# Patient Record
Sex: FEMALE | Race: OTHER | HISPANIC OR LATINO | URBAN - METROPOLITAN AREA
[De-identification: names, ages, dates, MRNs, and addresses within clinical notes are randomized per-mention and may not be internally consistent; named-entity substitution may affect disease eponyms.]

---

## 2023-03-03 ENCOUNTER — OUTPATIENT (OUTPATIENT)
Dept: OUTPATIENT SERVICES | Facility: HOSPITAL | Age: 68
LOS: 1 days | End: 2023-03-03
Payer: MEDICARE

## 2023-03-03 ENCOUNTER — RESULT REVIEW (OUTPATIENT)
Age: 68
End: 2023-03-03

## 2023-03-03 DIAGNOSIS — R55 SYNCOPE AND COLLAPSE: ICD-10-CM

## 2023-03-03 DIAGNOSIS — Z00.8 ENCOUNTER FOR OTHER GENERAL EXAMINATION: ICD-10-CM

## 2023-03-03 DIAGNOSIS — R06.02 SHORTNESS OF BREATH: ICD-10-CM

## 2023-03-03 PROCEDURE — 78452 HT MUSCLE IMAGE SPECT MULT: CPT

## 2023-03-03 PROCEDURE — A9500: CPT

## 2023-03-03 PROCEDURE — 78452 HT MUSCLE IMAGE SPECT MULT: CPT | Mod: 26

## 2023-03-03 PROCEDURE — 93018 CV STRESS TEST I&R ONLY: CPT

## 2023-03-04 DIAGNOSIS — R55 SYNCOPE AND COLLAPSE: ICD-10-CM

## 2023-03-09 DIAGNOSIS — R06.02 SHORTNESS OF BREATH: ICD-10-CM

## 2023-03-09 DIAGNOSIS — R55 SYNCOPE AND COLLAPSE: ICD-10-CM

## 2023-03-10 DIAGNOSIS — R55 SYNCOPE AND COLLAPSE: ICD-10-CM

## 2023-03-10 DIAGNOSIS — R06.02 SHORTNESS OF BREATH: ICD-10-CM

## 2023-07-18 PROBLEM — R06.02 SHORTNESS OF BREATH: Status: ACTIVE | Noted: 2023-07-18

## 2023-07-19 ENCOUNTER — APPOINTMENT (OUTPATIENT)
Dept: CARDIOLOGY | Facility: CLINIC | Age: 68
End: 2023-07-19
Payer: MEDICARE

## 2023-07-19 VITALS — HEIGHT: 61 IN | BODY MASS INDEX: 21.9 KG/M2 | WEIGHT: 116 LBS

## 2023-07-19 VITALS — HEART RATE: 88 BPM | DIASTOLIC BLOOD PRESSURE: 70 MMHG | SYSTOLIC BLOOD PRESSURE: 110 MMHG

## 2023-07-19 DIAGNOSIS — R06.02 SHORTNESS OF BREATH: ICD-10-CM

## 2023-07-19 PROCEDURE — 99204 OFFICE O/P NEW MOD 45 MIN: CPT | Mod: 25

## 2023-07-19 PROCEDURE — 93000 ELECTROCARDIOGRAM COMPLETE: CPT

## 2023-07-19 NOTE — PHYSICAL EXAM
[Normal Venous Pressure] : normal venous pressure [Normal S1, S2] : normal S1, S2 [Clear Lung Fields] : clear lung fields [Soft] : abdomen soft [No Edema] : no edema [de-identified] : rrr

## 2023-07-19 NOTE — DISCUSSION/SUMMARY
[FreeTextEntry1] : pt ppm stable \par abn ekg with IVANIA will check on echo as last did not see \par will get bloodwork \par f/u in 6 months \par no sob on ranexa  [EKG obtained to assist in diagnosis and management of assessed problem(s)] : EKG obtained to assist in diagnosis and management of assessed problem(s)

## 2023-07-19 NOTE — HISTORY OF PRESENT ILLNESS
[FreeTextEntry1] : Patient with anxiety, CERVICAL SPINE DZ, HLD, PPM with sss after syncope 11/22. \par ROSANNE damon: \par \par 3/2023: Nuclear:  No evidence of ischemia or infarction. LVEF is greater than 55%.\par 3/23: lvef 67%, mild to mod MR \par 7/23: LDL 67 HDL 62 GFR: 64  bnp: 17 \par pt LDL elevated in past but now on rsouvastatin and controlled, pt has sob with exertion intermittent and patient had negative stress nuclear on ranexa and says improved entirely now. \par pt walking in neighborhood now 3 times/week with no issues. \par pt saw dr. damon 2 weeks ago and PPM is norml with no issues. \par \par

## 2023-07-19 NOTE — CARDIOLOGY SUMMARY
[de-identified] : 7/19/23: NSR IVANIA with irbbb  [de-identified] : 3/2023: Nuclear: Normal myocardial perfusion. No evidence of ischemia or infarction. LVEF is greater than 55%.

## 2023-08-11 RX ORDER — ROSUVASTATIN CALCIUM 10 MG/1
10 TABLET, FILM COATED ORAL
Qty: 90 | Refills: 3 | Status: ACTIVE | COMMUNITY
Start: 2023-08-11 | End: 1900-01-01

## 2023-08-11 RX ORDER — RANOLAZINE 500 MG/1
500 TABLET, EXTENDED RELEASE ORAL
Qty: 180 | Refills: 3 | Status: ACTIVE | COMMUNITY
Start: 2023-08-11 | End: 1900-01-01

## 2023-12-01 ENCOUNTER — APPOINTMENT (OUTPATIENT)
Dept: CARDIOLOGY | Facility: CLINIC | Age: 68
End: 2023-12-01
Payer: MEDICARE

## 2023-12-01 PROCEDURE — 93306 TTE W/DOPPLER COMPLETE: CPT

## 2024-01-17 ENCOUNTER — APPOINTMENT (OUTPATIENT)
Dept: CARDIOLOGY | Facility: CLINIC | Age: 69
End: 2024-01-17

## 2024-01-18 ENCOUNTER — APPOINTMENT (OUTPATIENT)
Dept: NEUROLOGY | Facility: CLINIC | Age: 69
End: 2024-01-18
Payer: MEDICARE

## 2024-01-18 VITALS
SYSTOLIC BLOOD PRESSURE: 120 MMHG | HEART RATE: 95 BPM | HEIGHT: 60 IN | BODY MASS INDEX: 23.56 KG/M2 | DIASTOLIC BLOOD PRESSURE: 71 MMHG | WEIGHT: 120 LBS

## 2024-01-18 DIAGNOSIS — Z86.39 PERSONAL HISTORY OF OTHER ENDOCRINE, NUTRITIONAL AND METABOLIC DISEASE: ICD-10-CM

## 2024-01-18 DIAGNOSIS — Z86.79 PERSONAL HISTORY OF OTHER DISEASES OF THE CIRCULATORY SYSTEM: ICD-10-CM

## 2024-01-18 DIAGNOSIS — Z78.9 OTHER SPECIFIED HEALTH STATUS: ICD-10-CM

## 2024-01-18 PROCEDURE — 99204 OFFICE O/P NEW MOD 45 MIN: CPT

## 2024-01-18 NOTE — HISTORY OF PRESENT ILLNESS
[FreeTextEntry1] : Ms. Rueda is a 68-year-old woman who presents today in neurologic consultation accompanied by her daughter for symptoms of short-term memory loss and forgetfulness over the past year. Patient will forget errands. She repeats herself and has to be told the same things over again. She has left the stove on occasionally. Daughter has found patient's cellphone in the refrigerator. Patient is retired and does not drive. Patient's mother and sister both had Alzheimer's disease.   Patient also complains of frequent headaches and dizziness. She has a permanent pacemaker placed as well.

## 2024-01-18 NOTE — PHYSICAL EXAM
[FreeTextEntry1] :  NEUROLOGICAL EXAMINATION:  Mental Status: Patient scored either 19/30 or 24/30 on the MMSE depending upon whether we included subtraction which she could do none of.   Cranial Nerves Cranial Nerves:  II, III, IV, VI: Pupils are equal, round, and reactive to light and accommodation. No evidence of afferent pupillary defect. Visual fields are full to confrontation. Eye movements are full without evidence of nystagmus or internuclear ophthalmoplegia. Funduscopic examination reveals sharp disc margins.  V: Normal jaw movements. Normal facial sensation.  VII: Normal facial motor testing.  VIII: Grossly normal hearing bilaterally.  IX, X: Palate moves symmetrically. No dysarthria.  XI: Normal shoulder shrug and sternocleidomastoid power.  XII: Tongue appears normal and protrudes in the midline.  Motor: Normal bulk, tone, and power throughout.  Muscle Stretch Reflexes (right/left): 2+ symmetrical.  Plantar Responses: Flexor bilaterally.  Coordination: Normal finger to nose and heel to shin testing, no truncal ataxia and no tremor.  Sensation: Normal primary sensation. Normal double simultaneous stimulation.  Gait and Station: Normal base, stride, and turning. Normal toe and heel walking. Normal tandem. Negative Romberg.

## 2024-01-18 NOTE — ASSESSMENT
[FreeTextEntry1] : Impression is that of early dementia, mild cognitive impairment. I recommended an MRI of the brain if her pacemaker is MRI compatible. If the pacemaker is not compatible, we should get an FDG PET scan because of strong family history of Alzheimer's disease. We will also obtain a neuropsychological evaluation.    Total clinician time spent today on the patient is 45 minutes including preparing to see the patient, obtaining and/or reviewing and confirming history, performing medically necessary and appropriate examination, counseling and educating the patient and/or family, documenting clinical information in the EHR and communicating and/or referring to other healthcare professionals.   Entered by Bridgett Al acting as scribe for Dr. Malik.   The documentation recorded by the scribe, in my presence, accurately reflects the service I personally performed, and the decisions made by me with my edits as appropriate. Charli Malik MD, FAAN, FACP Diplomate [employer] Board of Psychiatry & Neurology.

## 2024-01-30 ENCOUNTER — APPOINTMENT (OUTPATIENT)
Dept: CARDIOLOGY | Facility: CLINIC | Age: 69
End: 2024-01-30
Payer: MEDICARE

## 2024-01-30 VITALS — BODY MASS INDEX: 23.26 KG/M2 | OXYGEN SATURATION: 100 % | HEIGHT: 60 IN | WEIGHT: 118.5 LBS | HEART RATE: 108 BPM

## 2024-01-30 VITALS — DIASTOLIC BLOOD PRESSURE: 60 MMHG | HEART RATE: 85 BPM | SYSTOLIC BLOOD PRESSURE: 122 MMHG

## 2024-01-30 DIAGNOSIS — Z00.00 ENCOUNTER FOR GENERAL ADULT MEDICAL EXAMINATION W/OUT ABNORMAL FINDINGS: ICD-10-CM

## 2024-01-30 DIAGNOSIS — R94.31 ABNORMAL ELECTROCARDIOGRAM [ECG] [EKG]: ICD-10-CM

## 2024-01-30 PROCEDURE — 99214 OFFICE O/P EST MOD 30 MIN: CPT

## 2024-01-30 NOTE — DISCUSSION/SUMMARY
[FreeTextEntry1] : pt ppm stable  abn ekg with IVANIA  cont ranolazine er 500 q12 no sob  cont rosuvastatin 10 mg po qd  will get bloodwork  get carotid  increase hydration  get PPM checked  f/u in 2 months

## 2024-01-30 NOTE — PHYSICAL EXAM
[Normal Venous Pressure] : normal venous pressure [Normal S1, S2] : normal S1, S2 [Clear Lung Fields] : clear lung fields [Soft] : abdomen soft [No Edema] : no edema [de-identified] : rrr

## 2024-01-30 NOTE — CARDIOLOGY SUMMARY
[de-identified] : 7/19/23: NSR IVANIA with irbbb  [de-identified] : 3/2023: Nuclear: Normal myocardial perfusion. No evidence of ischemia or infarction. LVEF is greater than 55%.

## 2024-01-30 NOTE — HISTORY OF PRESENT ILLNESS
[FreeTextEntry1] : Patient with anxiety, CERVICAL SPINE DZ, HLD, PPM with sss after syncope 11/22., Angina  ROSANNE damon:   3/2023: Nuclear:  No evidence of ischemia or infarction. LVEF is greater than 55%. 3/23: lvef 67%, mild to mod MR  7/23: LDL 67 HDL 62 GFR: 64  bnp: 17  pt LDL elevated in past but now on rsouvastatin and controlled, pt has sob with exertion intermittent and patient had negative stress nuclear on ranexa and says improved entirely now.  pt walking in neighborhood now 3 times/week with no issues.  pt saw dr. damon 2 weeks ago and PPM is normal with no issues.   1/30/24: 12/1/23: EF: 62%, e' sept: 0.06, E/e': 11, CO: 4, LVOT: 16, DD1, mild MR, mr stable  labs:  pt c/o of dizzy spells intermittent, pt was flushed through her whole body the other dya. pt without angina on ranolazine.  pt says worse with getting up to got to bathroom and patient getting slower and removes. pt still getting lightheaded when gets up from bed. pt not drinking enough water per day. pt possibly 32 ounces daily.  pt to get PPM checked (dr. damon)

## 2024-03-18 ENCOUNTER — APPOINTMENT (OUTPATIENT)
Dept: CARDIOLOGY | Facility: CLINIC | Age: 69
End: 2024-03-18
Payer: MEDICARE

## 2024-03-18 PROCEDURE — 93880 EXTRACRANIAL BILAT STUDY: CPT

## 2024-03-25 ENCOUNTER — APPOINTMENT (OUTPATIENT)
Dept: CARDIOLOGY | Facility: CLINIC | Age: 69
End: 2024-03-25
Payer: MEDICARE

## 2024-03-25 VITALS — WEIGHT: 120 LBS | BODY MASS INDEX: 22.66 KG/M2 | HEIGHT: 61 IN

## 2024-03-25 VITALS — DIASTOLIC BLOOD PRESSURE: 70 MMHG | SYSTOLIC BLOOD PRESSURE: 110 MMHG | HEART RATE: 85 BPM

## 2024-03-25 DIAGNOSIS — I20.9 ANGINA PECTORIS, UNSPECIFIED: ICD-10-CM

## 2024-03-25 DIAGNOSIS — R55 SYNCOPE AND COLLAPSE: ICD-10-CM

## 2024-03-25 DIAGNOSIS — Z95.0 PRESENCE OF CARDIAC PACEMAKER: ICD-10-CM

## 2024-03-25 DIAGNOSIS — Z86.79 PERSONAL HISTORY OF OTHER DISEASES OF THE CIRCULATORY SYSTEM: ICD-10-CM

## 2024-03-25 DIAGNOSIS — I34.0 NONRHEUMATIC MITRAL (VALVE) INSUFFICIENCY: ICD-10-CM

## 2024-03-25 DIAGNOSIS — E78.2 MIXED HYPERLIPIDEMIA: ICD-10-CM

## 2024-03-25 PROCEDURE — 99214 OFFICE O/P EST MOD 30 MIN: CPT

## 2024-03-25 NOTE — HISTORY OF PRESENT ILLNESS
[FreeTextEntry1] : Patient with PPM with sss after syncope 11/22., Angina, mitral regurgitation, anxiety, CERVICAL SPINE DZ, HLD,  EP marisol:   3/2023: Nuclear:  No evidence of ischemia or infarction. LVEF is greater than 55%. 3/23: lvef 67%, mild to mod MR  7/23: LDL 67 HDL 62 GFR: 64  bnp: 17  pt LDL elevated in past but now on rsouvastatin and controlled, pt has sob with exertion intermittent and patient had negative stress nuclear on ranexa and says improved entirely now.  pt walking in neighborhood now 3 times/week with no issues.  pt saw dr. damon 2 weeks ago and PPM is normal with no issues.   1/30/24: 12/1/23: EF: 62%, e' sept: 0.06, E/e': 11, CO: 4, LVOT: 16, DD1, mild MR, mr stable  labs:  pt c/o of dizzy spells intermittent, pt was flushed through her whole body the other dya. pt without angina on ranolazine.  pt says worse with getting up to got to bathroom and patient getting slower and removes. pt still getting lightheaded when gets up from bed. pt not drinking enough water per day. pt possibly 32 ounces daily.  pt to get PPM checked (dr. damon)  3/25/24: 3/18/24: Carotid: b/l less than 50% ICA  2/24: gfr: 65, HDL 71 LDL 51 see EP NOTE: PT WITH MOSTLY AS/VS  < 0.1%  standing bp is 108/70, pt says getting dizzy more frequently now. PPM has no events from 2/1/24  pt says when laid down and gets up she fills quickly. pt does not drink water per daughter only 2 bottles. pt says sob at times and tired walking at [employer] it.s pt sleeps poorly at night.

## 2024-03-25 NOTE — CARDIOLOGY SUMMARY
[de-identified] : 7/19/23: NSR IVANIA with irbbb  [de-identified] : 3/2023: Nuclear: Normal myocardial perfusion. No evidence of ischemia or infarction. LVEF is greater than 55%.

## 2024-03-25 NOTE — PHYSICAL EXAM
[Normal Venous Pressure] : normal venous pressure [Clear Lung Fields] : clear lung fields [Normal S1, S2] : normal S1, S2 [No Edema] : no edema [Soft] : abdomen soft [de-identified] : rrr

## 2024-04-25 ENCOUNTER — OUTPATIENT (OUTPATIENT)
Dept: OUTPATIENT SERVICES | Facility: HOSPITAL | Age: 69
LOS: 1 days | End: 2024-04-25
Payer: MEDICARE

## 2024-04-25 ENCOUNTER — RESULT REVIEW (OUTPATIENT)
Age: 69
End: 2024-04-25

## 2024-04-25 DIAGNOSIS — F03.90 UNSPECIFIED DEMENTIA WITHOUT BEHAVIORAL DISTURBANCE: ICD-10-CM

## 2024-04-25 DIAGNOSIS — Z00.8 ENCOUNTER FOR OTHER GENERAL EXAMINATION: ICD-10-CM

## 2024-04-25 PROCEDURE — 70551 MRI BRAIN STEM W/O DYE: CPT | Mod: 26

## 2024-04-25 PROCEDURE — 70551 MRI BRAIN STEM W/O DYE: CPT

## 2024-04-26 DIAGNOSIS — F03.90 UNSPECIFIED DEMENTIA WITHOUT BEHAVIORAL DISTURBANCE: ICD-10-CM

## 2024-05-07 ENCOUNTER — TRANSCRIPTION ENCOUNTER (OUTPATIENT)
Age: 69
End: 2024-05-07

## 2024-05-07 ENCOUNTER — OUTPATIENT (OUTPATIENT)
Dept: OUTPATIENT SERVICES | Facility: HOSPITAL | Age: 69
LOS: 1 days | Discharge: HOME | End: 2024-05-07

## 2024-05-07 VITALS
TEMPERATURE: 97 F | HEART RATE: 101 BPM | DIASTOLIC BLOOD PRESSURE: 89 MMHG | SYSTOLIC BLOOD PRESSURE: 98 MMHG | HEIGHT: 61 IN | RESPIRATION RATE: 18 BRPM | WEIGHT: 119.93 LBS

## 2024-05-07 VITALS
RESPIRATION RATE: 18 BRPM | DIASTOLIC BLOOD PRESSURE: 61 MMHG | HEART RATE: 72 BPM | SYSTOLIC BLOOD PRESSURE: 106 MMHG | OXYGEN SATURATION: 100 %

## 2024-05-07 DIAGNOSIS — Z95.0 PRESENCE OF CARDIAC PACEMAKER: Chronic | ICD-10-CM

## 2024-05-07 DIAGNOSIS — Z12.11 ENCOUNTER FOR SCREENING FOR MALIGNANT NEOPLASM OF COLON: ICD-10-CM

## 2024-05-07 DIAGNOSIS — Z98.891 HISTORY OF UTERINE SCAR FROM PREVIOUS SURGERY: Chronic | ICD-10-CM

## 2024-05-07 RX ORDER — ROSUVASTATIN CALCIUM 5 MG/1
1 TABLET ORAL
Refills: 0 | DISCHARGE

## 2024-05-07 RX ORDER — METOPROLOL TARTRATE 50 MG
1 TABLET ORAL
Refills: 0 | DISCHARGE

## 2024-05-07 RX ORDER — RANOLAZINE 500 MG/1
500 TABLET, FILM COATED, EXTENDED RELEASE ORAL
Refills: 0 | DISCHARGE

## 2024-05-07 NOTE — ASU PATIENT PROFILE, ADULT - NSICDXPASTMEDICALHX_GEN_ALL_CORE_FT
PAST MEDICAL HISTORY:  Anxiety     High cholesterol     History of loss of consciousness     Migraines

## 2024-05-07 NOTE — ASU DISCHARGE PLAN (ADULT/PEDIATRIC) - CARE PROVIDER_API CALL
Herson Delatorre J  Gastroenterology  [address] [place] [address]  Phone: [phone]  Fax: [phone]  Established Patient  Follow Up Time:

## 2024-05-07 NOTE — CHART NOTE - NSCHARTNOTEFT_GEN_A_CORE
PACU ANESTHESIA ADMISSION NOTE      Procedure:   Post op diagnosis:      ____  Intubated  TV:______       Rate: ______      FiO2: ______    _x___  Patent Airway    _x___  Full return of protective reflexes    ____  Full recovery from anesthesia / back to baseline status    Vitals: P 80 R 16 T 97.2 /58 SpO2 98%  T(C): 36.2 (05-07-24 @ 09:30), Max: 36.2 (05-07-24 @ 09:30)  HR: 101 (05-07-24 @ 09:30) (101 - 101)  BP: 98/89 (05-07-24 @ 09:30) (98/89 - 98/89)  RR: 18 (05-07-24 @ 09:30) (18 - 18)  SpO2: --    Mental Status:  ____ Awake   _____ Alert   _____ Drowsy   __x___ Sedated    Nausea/Vomiting:  _x___  NO       ______Yes,   See Post - Op Orders         Pain Scale (0-10):  __0___    Treatment: _x___ None    ____ See Post - Op/PCA Orders    Post - Operative Fluids:   __x__ Oral   ____ See Post - Op Orders  -------------as per surgeon    Plan: Discharge:   _x___Home       _____Floor     _____Critical Care    _____  Other:_________________    Comments:  No anesthesia issues or complications noted.  Discharge when criteria met.

## 2024-05-07 NOTE — ASU PATIENT PROFILE, ADULT - TEACHING/LEARNING EDUCATIONAL LEVEL
Continue reflux precautions and Pepcid twice daily  Refer to GI medicine in [place] for upper and lower endoscopy which secondary to Covid she has delayed.  CT scan of the neck with contrast  Follow-up evaluation here in 4 to 6 weeks  Call or return for problems  
high school

## 2024-05-07 NOTE — PRE-ANESTHESIA EVALUATION ADULT - NSANTHPMHFT_GEN_ALL_CORE
HX of SSS as per cardiology note, recent nuclear stress test normal  S/P PPM  Lungs clear  no murmur

## 2024-05-09 ENCOUNTER — APPOINTMENT (OUTPATIENT)
Dept: NEUROLOGY | Facility: CLINIC | Age: 69
End: 2024-05-09
Payer: MEDICARE

## 2024-05-09 DIAGNOSIS — K64.8 OTHER HEMORRHOIDS: ICD-10-CM

## 2024-05-09 DIAGNOSIS — Z12.11 ENCOUNTER FOR SCREENING FOR MALIGNANT NEOPLASM OF COLON: ICD-10-CM

## 2024-05-09 DIAGNOSIS — K57.30 DIVERTICULOSIS OF LARGE INTESTINE WITHOUT PERFORATION OR ABSCESS WITHOUT BLEEDING: ICD-10-CM

## 2024-05-09 DIAGNOSIS — Z95.0 PRESENCE OF CARDIAC PACEMAKER: ICD-10-CM

## 2024-05-09 DIAGNOSIS — Z88.0 ALLERGY STATUS TO PENICILLIN: ICD-10-CM

## 2024-05-09 DIAGNOSIS — F03.90 UNSPECIFIED DEMENTIA W/OUT BEHAVIORAL DISTURBANCE: ICD-10-CM

## 2024-05-09 PROBLEM — E78.00 PURE HYPERCHOLESTEROLEMIA, UNSPECIFIED: Chronic | Status: ACTIVE | Noted: 2024-05-07

## 2024-05-09 PROBLEM — F41.9 ANXIETY DISORDER, UNSPECIFIED: Chronic | Status: ACTIVE | Noted: 2024-05-07

## 2024-05-09 PROBLEM — G43.909 MIGRAINE, UNSPECIFIED, NOT INTRACTABLE, WITHOUT STATUS MIGRAINOSUS: Chronic | Status: ACTIVE | Noted: 2024-05-07

## 2024-05-09 PROBLEM — Z87.898 PERSONAL HISTORY OF OTHER SPECIFIED CONDITIONS: Chronic | Status: ACTIVE | Noted: 2024-05-07

## 2024-05-09 PROCEDURE — 99214 OFFICE O/P EST MOD 30 MIN: CPT

## 2024-05-09 NOTE — PHYSICAL EXAM
Pt referred to  6/2017 for ADD, pt has not made an appointment with them yet. Pt states md never explained why she was being referred and wondered why md cannot continue to fill rx.  Pt states she didn't schedule because she was going to question this at next appointment. Writer did explain new [facility] regulations.  Writer gave pt number to  to schedule, unsure if md will refill Adderall at this time or will require pt to be seen. Will consult md and call with further instruction.   [FreeTextEntry1] : NEUROLOGICAL EXAMINATION:  Mental Status: Patient scored either 19/30 or 24/30 on the MMSE depending upon whether we included subtraction which she could do none of.   Cranial Nerves Cranial Nerves:  II, III, IV, VI: Pupils are equal, round, and reactive to light and accommodation. No evidence of afferent pupillary defect. Visual fields are full to confrontation. Eye movements are full without evidence of nystagmus or internuclear ophthalmoplegia. Funduscopic examination reveals sharp disc margins.  V: Normal jaw movements. Normal facial sensation.  VII: Normal facial motor testing.  VIII: Grossly normal hearing bilaterally.  IX, X: Palate moves symmetrically. No dysarthria.  XI: Normal shoulder shrug and sternocleidomastoid power.  XII: Tongue appears normal and protrudes in the midline.  Motor: Normal bulk, tone, and power throughout.  Muscle Stretch Reflexes (right/left): 2+ symmetrical.  Plantar Responses: Flexor bilaterally.  Coordination: Normal finger to nose and heel to shin testing, no truncal ataxia and no tremor.  Sensation: Normal primary sensation. Normal double simultaneous stimulation.  Gait and Station: Normal base, stride, and turning. Normal toe and heel walking. Normal tandem. Negative Romberg.

## 2024-05-09 NOTE — HISTORY OF PRESENT ILLNESS
[FreeTextEntry1] : Ms. Rueda is a 68-year-old woman who presents today in neurologic consultation accompanied by her daughter for symptoms of short-term memory loss and forgetfulness over the past year. Patient will forget errands. She repeats herself and has to be told the same things over again. She has left the stove on occasionally. Daughter has found patient's cellphone in the refrigerator. Patient is retired and does not drive. Patient's mother and sister both had Alzheimer's disease.   Patient also complains of frequent headaches and dizziness. She has a permanent pacemaker placed as well.   5-9-2024: I had the pleasure of seeing Mrs. Rueda for a revisit encounter today. She is accompanied by her daughter. She is being seen for a workup for memory loss and forgetfulness. She underwent an MRI of the brain which was essentially normal for her age with minimal microvascular changes. Due to the strong family history of Alzheimer's disease, I would like to further work up her symptoms. She gives a history of 2 family members being diagnosed with Alzheimer's disease.

## 2024-05-09 NOTE — DATA REVIEWED
[FreeTextEntry1] : MRI of the brain taken on 4- IMPRESSION: No acute intracranial pathology. Mild chronic microvascular ischemic changes.

## 2024-06-11 ENCOUNTER — OUTPATIENT (OUTPATIENT)
Dept: OUTPATIENT SERVICES | Facility: HOSPITAL | Age: 69
LOS: 1 days | End: 2024-06-11
Payer: MEDICARE

## 2024-06-11 ENCOUNTER — RESULT REVIEW (OUTPATIENT)
Age: 69
End: 2024-06-11

## 2024-06-11 DIAGNOSIS — Z95.0 PRESENCE OF CARDIAC PACEMAKER: Chronic | ICD-10-CM

## 2024-06-11 DIAGNOSIS — Z98.891 HISTORY OF UTERINE SCAR FROM PREVIOUS SURGERY: Chronic | ICD-10-CM

## 2024-06-11 DIAGNOSIS — F03.90 UNSPECIFIED DEMENTIA, UNSPECIFIED SEVERITY, WITHOUT BEHAVIORAL DISTURBANCE, PSYCHOTIC DISTURBANCE, MOOD DISTURBANCE, AND ANXIETY: ICD-10-CM

## 2024-06-11 LAB — GLUCOSE BLDC GLUCOMTR-MCNC: 95 MG/DL — SIGNIFICANT CHANGE UP (ref 70–99)

## 2024-06-11 PROCEDURE — 78608 BRAIN IMAGING (PET): CPT | Mod: 26

## 2024-06-11 PROCEDURE — 78999 UNLISTED MISC PX DX NUC MED: CPT

## 2024-06-11 PROCEDURE — 78999 UNLISTED MISC PX DX NUC MED: CPT | Mod: 26

## 2024-06-11 PROCEDURE — 82962 GLUCOSE BLOOD TEST: CPT

## 2024-06-11 PROCEDURE — A9552: CPT

## 2024-06-11 PROCEDURE — 78608 BRAIN IMAGING (PET): CPT

## 2024-06-12 DIAGNOSIS — F03.90 UNSPECIFIED DEMENTIA WITHOUT BEHAVIORAL DISTURBANCE: ICD-10-CM

## 2024-07-15 ENCOUNTER — APPOINTMENT (OUTPATIENT)
Dept: CARDIOLOGY | Facility: CLINIC | Age: 69
End: 2024-07-15
Payer: MEDICARE

## 2024-07-15 VITALS — WEIGHT: 119 LBS | BODY MASS INDEX: 22.47 KG/M2 | HEIGHT: 61 IN

## 2024-07-15 VITALS — HEART RATE: 70 BPM | DIASTOLIC BLOOD PRESSURE: 70 MMHG | SYSTOLIC BLOOD PRESSURE: 110 MMHG

## 2024-07-15 DIAGNOSIS — I20.9 ANGINA PECTORIS, UNSPECIFIED: ICD-10-CM

## 2024-07-15 PROCEDURE — 99214 OFFICE O/P EST MOD 30 MIN: CPT

## 2024-07-16 ENCOUNTER — APPOINTMENT (OUTPATIENT)
Dept: NEUROLOGY | Facility: CLINIC | Age: 69
End: 2024-07-16
Payer: MEDICARE

## 2024-07-16 VITALS
BODY MASS INDEX: 22.66 KG/M2 | DIASTOLIC BLOOD PRESSURE: 71 MMHG | SYSTOLIC BLOOD PRESSURE: 109 MMHG | HEART RATE: 81 BPM | WEIGHT: 120 LBS | HEIGHT: 61 IN

## 2024-07-16 DIAGNOSIS — F03.90 UNSPECIFIED DEMENTIA W/OUT BEHAVIORAL DISTURBANCE: ICD-10-CM

## 2024-07-16 DIAGNOSIS — Z95.0 PRESENCE OF CARDIAC PACEMAKER: ICD-10-CM

## 2024-07-16 DIAGNOSIS — G31.83 DEMENTIA WITH LEWY BODIES: ICD-10-CM

## 2024-07-16 DIAGNOSIS — F02.A3 DEMENTIA WITH LEWY BODIES: ICD-10-CM

## 2024-07-16 PROCEDURE — 99215 OFFICE O/P EST HI 40 MIN: CPT

## 2024-07-16 RX ORDER — DONEPEZIL HYDROCHLORIDE 5 MG/1
5 TABLET ORAL
Qty: 30 | Refills: 2 | Status: ACTIVE | COMMUNITY
Start: 2024-07-16 | End: 1900-01-01

## 2024-08-12 ENCOUNTER — RX RENEWAL (OUTPATIENT)
Age: 69
End: 2024-08-12

## 2024-08-19 ENCOUNTER — APPOINTMENT (OUTPATIENT)
Dept: NEUROLOGY | Facility: CLINIC | Age: 69
End: 2024-08-19
Payer: MEDICARE

## 2024-08-19 ENCOUNTER — RX RENEWAL (OUTPATIENT)
Age: 69
End: 2024-08-19

## 2024-08-19 VITALS
WEIGHT: 120 LBS | SYSTOLIC BLOOD PRESSURE: 110 MMHG | HEIGHT: 60 IN | DIASTOLIC BLOOD PRESSURE: 66 MMHG | BODY MASS INDEX: 23.56 KG/M2 | HEART RATE: 67 BPM

## 2024-08-19 DIAGNOSIS — G31.83 DEMENTIA WITH LEWY BODIES: ICD-10-CM

## 2024-08-19 DIAGNOSIS — F03.90 UNSPECIFIED DEMENTIA W/OUT BEHAVIORAL DISTURBANCE: ICD-10-CM

## 2024-08-19 DIAGNOSIS — F02.A3 DEMENTIA WITH LEWY BODIES: ICD-10-CM

## 2024-08-19 PROCEDURE — 99213 OFFICE O/P EST LOW 20 MIN: CPT

## 2024-08-19 RX ORDER — ESCITALOPRAM OXALATE 5 MG/1
5 TABLET ORAL
Qty: 30 | Refills: 2 | Status: ACTIVE | COMMUNITY
Start: 2024-08-19 | End: 1900-01-01

## 2024-08-19 NOTE — HISTORY OF PRESENT ILLNESS
[FreeTextEntry1] : Original Presentation : Ms. Lou is a 68-year-old woman who presents today in neurologic consultation accompanied by her daughter for symptoms of short-term memory loss and forgetfulness over the past year. Patient will forget errands. She repeats herself and has to be told the same things over again. She has left the stove on occasionally. Daughter has found patient's cellphone in the refrigerator. Patient is retired and does not drive. Patient's mother and sister both had Alzheimer's disease.   Patient also complains of frequent headaches and dizziness. She has a permanent pacemaker placed as well.   Diagnostic Testing :   Brain PET 6.11.24 : IMPRESSION: Abnormal hypometabolism particularly pronounced in the parieto-occipital and temporal regions, with involvement of the mesial occipital lobes and accompanying cingulate island sign, findings consistent with underlying neurodegenerative disease, pattern most suggestive of dementia with Lewy bodies (DLB). Correlation with clinical exam and neuropsychological assessment is advised. --- End of Report ---   MRI Brain fusion w/ MR  : IMPRESSION: Abnormal hypometabolism particularly pronounced in the parieto-occipital and temporal regions, with involvement of the mesial occipital lobes and accompanying cingulate island sign, findings consistent with underlying neurodegenerative disease, pattern most suggestive of dementia with Lewy bodies (DLB). Correlation with clinical exam and neuropsychological assessment is advised. --- End of Report ---    MRI Brain 4.25.24 : IMPRESSION: No acute intracranial pathology. Mild chronic microvascular ischemic changes. --- End of Report   Today : Today I had the pleasure of seeing Ms. LOU in our office for follow up.  Their past medical history and imaging have been reviewed.    Patient  remains under our neurologic care for Alzheimer's dementia (Lewy Body). This is a chronic condition she is receiving active treatment for. Patient first presented to our office for evaluation of memory loss and cognitive impairments noticed by her daughter over the last year. Brain PET Scan showed abnormal hypometabolism particularly pronounced in the parieto-occipital and temporal regions, with involvement of the mesial occipital lobes and accompanying cingulate island sign, findings consistent with underlying neurodegenerative disease, pattern most suggestive of dementia with Lewy bodies (DLB). Patient has undergone neuropsychological testing full report pending. We previously discussed the pathophysiology of Alzheimer's disease and Lewy Body Dementia. Although there is no cure or specific intervention for Lewy body dementia, we did discuss medications targeted for Alzheimer's, used with hope of slowing disease progression. And for the last 3 weeks she has been on a trial of Donepezil 5mg once daily at night which patient and family report that she has been tolerating well with only mild side effects of lose stool in the AM which she states is not severe and does not occur daily. I also provided patient and family with information & referral to see an Alzheimer's / Dementia specialist for second opinion and to discuss any new therapy / clinical trials that may be available. Patient is able to converse in conversation with appropriate mood, affect and insight. She is oriented to person place and time but daughter states she frequently forgets small details and misplaces her belongings. Patient admits to hx of anxiety, denies new mood / behavioral changes or psychiatric features. Today we will discuss increasing her donepezil to 10mg daily after she completes 4 weeks of the 5mg to which patient and family were agreeable.

## 2024-08-19 NOTE — ASSESSMENT
[FreeTextEntry1] : 68 year old female with Huan Body Dementia. She has been tolerating Donepezil 5mg once daily at night well for the last 3 weeks  and will continue as is for the next week before increasing to 10mg on the 5th week. She will f/u with her PCP and other providers before returning to the office for f/u in 1-2 months. If she is able to tolerate 10mg well family would like to begin supplements such as Alpha Brain but I told them to hold off at this time so we know if any side effects are related to the Donepezil. Patient is aware that they may call/ contact the office at any time if they have any additional questions or concerns regarding their management. All potential risks, benefits, side effects and interactions of  the medications prescribed were discussed in detail with both the patient and family at the time of todays encounter.   Supervising Physician : Charli Malik MD

## 2024-08-19 NOTE — REVIEW OF SYSTEMS
[As Noted in HPI] : as noted in HPI [Memory Lapses or Loss] : memory loss [Repeating Questions] : repeated questioning about recent events

## 2024-10-07 NOTE — PHYSICAL EXAM
[FreeTextEntry1] : NEUROLOGICAL EXAMINATION:  Mental Status: Patient scored either 19/30 or 24/30 on the MMSE depending upon whether we included subtraction which she could do none of.   Cranial Nerves Cranial Nerves:  II, III, IV, VI: Pupils are equal, round, and reactive to light and accommodation. No evidence of afferent pupillary defect. Visual fields are full to confrontation. Eye movements are full without evidence of nystagmus or internuclear ophthalmoplegia. Funduscopic examination reveals sharp disc margins.  V: Normal jaw movements. Normal facial sensation.  VII: Normal facial motor testing.  VIII: Grossly normal hearing bilaterally.  IX, X: Palate moves symmetrically. No dysarthria.  XI: Normal shoulder shrug and sternocleidomastoid power.  XII: Tongue appears normal and protrudes in the midline.  Motor: Normal bulk, tone, and power throughout.  Muscle Stretch Reflexes (right/left): 2+ symmetrical.  Plantar Responses: Flexor bilaterally.  Coordination: Normal finger to nose and heel to shin testing, no truncal ataxia and no tremor.  Sensation: Normal primary sensation. Normal double simultaneous stimulation.  Gait and Station: Normal base, stride, and turning. Normal toe and heel walking. Normal tandem. Negative Romberg.
Adult

## 2024-10-15 ENCOUNTER — APPOINTMENT (OUTPATIENT)
Dept: NEUROLOGY | Facility: CLINIC | Age: 69
End: 2024-10-15
Payer: MEDICARE

## 2024-10-15 VITALS
DIASTOLIC BLOOD PRESSURE: 71 MMHG | BODY MASS INDEX: 23.56 KG/M2 | HEART RATE: 69 BPM | WEIGHT: 120 LBS | HEIGHT: 60 IN | SYSTOLIC BLOOD PRESSURE: 120 MMHG

## 2024-10-15 DIAGNOSIS — F02.A3 DEMENTIA WITH LEWY BODIES: ICD-10-CM

## 2024-10-15 DIAGNOSIS — G31.83 DEMENTIA WITH LEWY BODIES: ICD-10-CM

## 2024-10-15 DIAGNOSIS — F03.90 UNSPECIFIED DEMENTIA W/OUT BEHAVIORAL DISTURBANCE: ICD-10-CM

## 2024-10-15 PROCEDURE — 99213 OFFICE O/P EST LOW 20 MIN: CPT

## 2024-10-15 RX ORDER — DONEPEZIL HYDROCHLORIDE 10 MG/1
10 TABLET ORAL DAILY
Qty: 30 | Refills: 5 | Status: ACTIVE | COMMUNITY
Start: 2024-10-15 | End: 1900-01-01

## 2024-11-10 ENCOUNTER — NON-APPOINTMENT (OUTPATIENT)
Age: 69
End: 2024-11-10

## 2024-11-11 ENCOUNTER — RX RENEWAL (OUTPATIENT)
Age: 69
End: 2024-11-11

## 2024-12-09 ENCOUNTER — APPOINTMENT (OUTPATIENT)
Dept: CARDIOLOGY | Facility: CLINIC | Age: 69
End: 2024-12-09
Payer: MEDICARE

## 2024-12-09 PROCEDURE — 93306 TTE W/DOPPLER COMPLETE: CPT

## 2025-01-15 ENCOUNTER — APPOINTMENT (OUTPATIENT)
Dept: NEUROLOGY | Facility: CLINIC | Age: 70
End: 2025-01-15
Payer: MEDICARE

## 2025-01-15 PROCEDURE — 99214 OFFICE O/P EST MOD 30 MIN: CPT

## 2025-01-15 RX ORDER — MEMANTINE HYDROCHLORIDE 10 MG/1
10 TABLET, FILM COATED ORAL
Qty: 30 | Refills: 3 | Status: ACTIVE | COMMUNITY
Start: 2025-01-15 | End: 1900-01-01

## 2025-01-15 RX ORDER — DONEPEZIL HYDROCHLORIDE 5 MG/1
5 TABLET ORAL
Qty: 90 | Refills: 3 | Status: ACTIVE | COMMUNITY
Start: 2025-01-15 | End: 1900-01-01

## 2025-01-21 ENCOUNTER — APPOINTMENT (OUTPATIENT)
Dept: CARDIOLOGY | Facility: CLINIC | Age: 70
End: 2025-01-21
Payer: MEDICARE

## 2025-01-21 ENCOUNTER — NON-APPOINTMENT (OUTPATIENT)
Age: 70
End: 2025-01-21

## 2025-01-21 VITALS
DIASTOLIC BLOOD PRESSURE: 64 MMHG | HEIGHT: 60 IN | WEIGHT: 120 LBS | HEART RATE: 106 BPM | OXYGEN SATURATION: 98 % | BODY MASS INDEX: 23.56 KG/M2 | SYSTOLIC BLOOD PRESSURE: 120 MMHG

## 2025-01-21 DIAGNOSIS — I20.9 ANGINA PECTORIS, UNSPECIFIED: ICD-10-CM

## 2025-01-21 PROCEDURE — G2211 COMPLEX E/M VISIT ADD ON: CPT

## 2025-01-21 PROCEDURE — 99214 OFFICE O/P EST MOD 30 MIN: CPT

## 2025-03-26 ENCOUNTER — RX RENEWAL (OUTPATIENT)
Age: 70
End: 2025-03-26

## 2025-04-23 ENCOUNTER — APPOINTMENT (OUTPATIENT)
Dept: NEUROLOGY | Facility: CLINIC | Age: 70
End: 2025-04-23
Payer: MEDICARE

## 2025-04-23 PROCEDURE — 99213 OFFICE O/P EST LOW 20 MIN: CPT

## 2025-05-22 ENCOUNTER — RX RENEWAL (OUTPATIENT)
Age: 70
End: 2025-05-22

## 2025-06-27 ENCOUNTER — RX RENEWAL (OUTPATIENT)
Age: 70
End: 2025-06-27

## 2025-07-07 ENCOUNTER — RX RENEWAL (OUTPATIENT)
Age: 70
End: 2025-07-07

## 2025-07-16 ENCOUNTER — APPOINTMENT (OUTPATIENT)
Dept: CARDIOLOGY | Facility: CLINIC | Age: 70
End: 2025-07-16
Payer: MEDICARE

## 2025-07-16 PROCEDURE — 93880 EXTRACRANIAL BILAT STUDY: CPT

## 2025-07-17 ENCOUNTER — APPOINTMENT (OUTPATIENT)
Dept: CARDIOLOGY | Facility: CLINIC | Age: 70
End: 2025-07-17

## 2025-08-01 ENCOUNTER — RX RENEWAL (OUTPATIENT)
Age: 70
End: 2025-08-01

## 2025-08-04 ENCOUNTER — RX RENEWAL (OUTPATIENT)
Age: 70
End: 2025-08-04

## 2025-09-17 ENCOUNTER — RX RENEWAL (OUTPATIENT)
Age: 70
End: 2025-09-17